# Patient Record
Sex: MALE
[De-identification: names, ages, dates, MRNs, and addresses within clinical notes are randomized per-mention and may not be internally consistent; named-entity substitution may affect disease eponyms.]

---

## 2018-02-13 VITALS — DIASTOLIC BLOOD PRESSURE: 69 MMHG | SYSTOLIC BLOOD PRESSURE: 116 MMHG

## 2018-02-13 LAB
APTT PPP: 30.6 SEC (ref 26.3–35.5)
BASOPHILS NFR BLD AUTO: 0.3 % (ref 0–5)
BUN SERPL-MCNC: 22 MG/DL (ref 7–18)
CHLORIDE SERPL-SCNC: 103 MMOL/L (ref 101–111)
CO2 SERPL-SCNC: 33 MMOL/L (ref 21–32)
CREAT SERPL-MCNC: 1.2 MG/DL (ref 0.5–1.5)
EOSINOPHIL NFR BLD AUTO: 1.8 % (ref 0–8)
ERYTHROCYTE [DISTWIDTH] IN BLOOD BY AUTOMATED COUNT: 15.6 % (ref 11–15.5)
GFR SERPL CREATININE-BSD FRML MDRD: 63 ML/MIN (ref 60–?)
GLUCOSE SERPL-MCNC: 121 MG/DL (ref 70–105)
HCT VFR BLD AUTO: 44.1 % (ref 42–54)
INR PPP: 1.08 (ref 0.85–1.15)
LYMPHOCYTES NFR SPEC AUTO: 25 % (ref 21–51)
MCH RBC QN AUTO: 27.5 PG (ref 27–33)
MCHC RBC AUTO-ENTMCNC: 34 G/DL (ref 32–36)
MCV RBC AUTO: 81.1 FL (ref 79–99)
MONOCYTES NFR BLD AUTO: 9.1 % (ref 3–13)
NEUTROPHILS NFR BLD AUTO: 63.8 % (ref 40–77)
NRBC BLD MANUAL-RTO: 0 % (ref 0–0.19)
PLATELET # BLD AUTO: 159 K/UL (ref 130–400)
POTASSIUM SERPL-SCNC: 4 MMOL/L (ref 3.5–5.1)
PROTHROMBIN TIME: 11.3 SEC (ref 9.6–11.6)
RBC # BLD AUTO: 5.44 MIL/UL (ref 4.5–6.2)
SODIUM SERPL-SCNC: 142 MMOL/L (ref 136–145)
WBC # BLD AUTO: 8.3 K/UL (ref 4.8–10.8)

## 2018-02-15 ENCOUNTER — HOSPITAL ENCOUNTER (OUTPATIENT)
Dept: HOSPITAL 90 - ENDO | Age: 73
Discharge: HOME | End: 2018-02-15
Attending: INTERNAL MEDICINE
Payer: COMMERCIAL

## 2018-02-15 VITALS — DIASTOLIC BLOOD PRESSURE: 64 MMHG | SYSTOLIC BLOOD PRESSURE: 99 MMHG

## 2018-02-15 VITALS — BODY MASS INDEX: 39.55 KG/M2 | WEIGHT: 292 LBS | HEIGHT: 72 IN

## 2018-02-15 VITALS — DIASTOLIC BLOOD PRESSURE: 62 MMHG | SYSTOLIC BLOOD PRESSURE: 102 MMHG

## 2018-02-15 VITALS — SYSTOLIC BLOOD PRESSURE: 116 MMHG | DIASTOLIC BLOOD PRESSURE: 75 MMHG

## 2018-02-15 DIAGNOSIS — I48.1: Primary | ICD-10-CM

## 2018-02-15 DIAGNOSIS — Z79.82: ICD-10-CM

## 2018-02-15 DIAGNOSIS — I10: ICD-10-CM

## 2018-02-15 DIAGNOSIS — Z79.899: ICD-10-CM

## 2018-02-15 DIAGNOSIS — Z79.01: ICD-10-CM

## 2018-02-15 DIAGNOSIS — I25.10: ICD-10-CM

## 2018-02-15 PROCEDURE — 85610 PROTHROMBIN TIME: CPT

## 2018-02-15 PROCEDURE — 36415 COLL VENOUS BLD VENIPUNCTURE: CPT

## 2018-02-15 PROCEDURE — 80048 BASIC METABOLIC PNL TOTAL CA: CPT

## 2018-02-15 PROCEDURE — 85730 THROMBOPLASTIN TIME PARTIAL: CPT

## 2018-02-15 PROCEDURE — 85025 COMPLETE CBC W/AUTO DIFF WBC: CPT

## 2018-02-15 PROCEDURE — 93005 ELECTROCARDIOGRAM TRACING: CPT

## 2018-02-15 PROCEDURE — 92960 CARDIOVERSION ELECTRIC EXT: CPT

## 2019-10-24 ENCOUNTER — HOSPITAL ENCOUNTER (OUTPATIENT)
Dept: HOSPITAL 90 - DAH | Age: 74
Discharge: HOME | End: 2019-10-24
Attending: INTERNAL MEDICINE
Payer: COMMERCIAL

## 2019-10-24 VITALS — SYSTOLIC BLOOD PRESSURE: 110 MMHG | DIASTOLIC BLOOD PRESSURE: 76 MMHG

## 2019-10-24 VITALS — DIASTOLIC BLOOD PRESSURE: 62 MMHG | SYSTOLIC BLOOD PRESSURE: 86 MMHG

## 2019-10-24 VITALS — HEIGHT: 72 IN | BODY MASS INDEX: 38.6 KG/M2 | WEIGHT: 285 LBS

## 2019-10-24 VITALS — DIASTOLIC BLOOD PRESSURE: 68 MMHG | SYSTOLIC BLOOD PRESSURE: 96 MMHG

## 2019-10-24 VITALS — DIASTOLIC BLOOD PRESSURE: 76 MMHG | SYSTOLIC BLOOD PRESSURE: 112 MMHG

## 2019-10-24 VITALS — SYSTOLIC BLOOD PRESSURE: 92 MMHG | DIASTOLIC BLOOD PRESSURE: 61 MMHG

## 2019-10-24 VITALS — DIASTOLIC BLOOD PRESSURE: 74 MMHG | SYSTOLIC BLOOD PRESSURE: 98 MMHG

## 2019-10-24 VITALS — SYSTOLIC BLOOD PRESSURE: 93 MMHG | DIASTOLIC BLOOD PRESSURE: 69 MMHG

## 2019-10-24 VITALS — DIASTOLIC BLOOD PRESSURE: 65 MMHG | SYSTOLIC BLOOD PRESSURE: 95 MMHG

## 2019-10-24 VITALS — DIASTOLIC BLOOD PRESSURE: 70 MMHG | SYSTOLIC BLOOD PRESSURE: 108 MMHG

## 2019-10-24 VITALS — SYSTOLIC BLOOD PRESSURE: 100 MMHG | DIASTOLIC BLOOD PRESSURE: 78 MMHG

## 2019-10-24 VITALS — DIASTOLIC BLOOD PRESSURE: 66 MMHG | SYSTOLIC BLOOD PRESSURE: 100 MMHG

## 2019-10-24 VITALS — DIASTOLIC BLOOD PRESSURE: 72 MMHG | SYSTOLIC BLOOD PRESSURE: 106 MMHG

## 2019-10-24 VITALS — DIASTOLIC BLOOD PRESSURE: 72 MMHG | SYSTOLIC BLOOD PRESSURE: 103 MMHG

## 2019-10-24 DIAGNOSIS — Z87.01: ICD-10-CM

## 2019-10-24 DIAGNOSIS — Z79.01: ICD-10-CM

## 2019-10-24 DIAGNOSIS — Z79.899: ICD-10-CM

## 2019-10-24 DIAGNOSIS — I48.0: Primary | ICD-10-CM

## 2019-10-24 DIAGNOSIS — Z79.82: ICD-10-CM

## 2019-10-24 DIAGNOSIS — E66.9: ICD-10-CM

## 2019-10-24 DIAGNOSIS — Z82.49: ICD-10-CM

## 2019-10-24 DIAGNOSIS — Z82.3: ICD-10-CM

## 2019-10-24 DIAGNOSIS — G47.33: ICD-10-CM

## 2019-10-24 DIAGNOSIS — E78.5: ICD-10-CM

## 2019-10-24 DIAGNOSIS — F41.9: ICD-10-CM

## 2019-10-24 DIAGNOSIS — Z95.0: ICD-10-CM

## 2019-10-24 DIAGNOSIS — I10: ICD-10-CM

## 2019-10-24 DIAGNOSIS — Z98.890: ICD-10-CM

## 2019-10-24 DIAGNOSIS — C61: ICD-10-CM

## 2019-10-24 LAB
APTT PPP: 32.5 SEC (ref 26.3–35.5)
BASOPHILS NFR BLD AUTO: 0.3 % (ref 0–5)
BUN SERPL-MCNC: 27 MG/DL (ref 7–18)
CHLORIDE SERPL-SCNC: 102 MMOL/L (ref 101–111)
CO2 SERPL-SCNC: 34 MMOL/L (ref 21–32)
CREAT SERPL-MCNC: 1.3 MG/DL (ref 0.5–1.5)
EOSINOPHIL NFR BLD AUTO: 3.1 % (ref 0–8)
ERYTHROCYTE [DISTWIDTH] IN BLOOD BY AUTOMATED COUNT: 16.7 % (ref 11–15.5)
GFR SERPL CREATININE-BSD FRML MDRD: 58 ML/MIN (ref 60–?)
GLUCOSE SERPL-MCNC: 115 MG/DL (ref 70–105)
HCT VFR BLD AUTO: 47.2 % (ref 42–54)
INR PPP: 1.11 (ref 0.85–1.15)
LYMPHOCYTES NFR SPEC AUTO: 19.6 % (ref 21–51)
MCH RBC QN AUTO: 27.4 PG (ref 27–33)
MCHC RBC AUTO-ENTMCNC: 33.3 G/DL (ref 32–36)
MCV RBC AUTO: 82.2 FL (ref 79–99)
MONOCYTES NFR BLD AUTO: 8.9 % (ref 3–13)
NEUTROPHILS NFR BLD AUTO: 68.1 % (ref 40–77)
NRBC BLD MANUAL-RTO: 0.1 % (ref 0–0.19)
PLATELET # BLD AUTO: 139 K/UL (ref 130–400)
POTASSIUM SERPL-SCNC: 4.2 MMOL/L (ref 3.5–5.1)
PROTHROMBIN TIME: 11.6 SEC (ref 9.6–11.6)
RBC # BLD AUTO: 5.74 MIL/UL (ref 4.5–6.2)
SODIUM SERPL-SCNC: 141 MMOL/L (ref 136–145)
WBC # BLD AUTO: 8.4 K/UL (ref 4.8–10.8)

## 2019-10-24 PROCEDURE — 85610 PROTHROMBIN TIME: CPT

## 2019-10-24 PROCEDURE — 92960 CARDIOVERSION ELECTRIC EXT: CPT

## 2019-10-24 PROCEDURE — 80048 BASIC METABOLIC PNL TOTAL CA: CPT

## 2019-10-24 PROCEDURE — 85025 COMPLETE CBC W/AUTO DIFF WBC: CPT

## 2019-10-24 PROCEDURE — 36415 COLL VENOUS BLD VENIPUNCTURE: CPT

## 2019-10-24 PROCEDURE — 85730 THROMBOPLASTIN TIME PARTIAL: CPT

## 2019-10-24 PROCEDURE — 99156 MOD SED OTH PHYS/QHP 5/>YRS: CPT

## 2019-10-24 PROCEDURE — 93005 ELECTROCARDIOGRAM TRACING: CPT

## 2019-10-24 NOTE — NUR
PATIENT ARRIVED



PATIENT ARRIVED TO DAY PATIENT ACCOMPANIED BY SPOUSE (TALYA). PATIENT AAOX3, RESPIRATIONS 
UNLABORED, VITAL SIGNS STABLE, DENIES ANY PAIN AT THIS TIME. PROCEDURE VERIFIED AND 
CONFIRMED WITH PATIENT. HOSPITAL ROUTINE EXPLAINED TO PATIENT AND SPOUSE AND BOTH VERBALIZED 
UNDERSTANDING. ALL QUESTIONS/CONCERNS ADDRESSED.

## 2019-10-24 NOTE — NUR
DISCHARGE INSTRUCTIONS



DISCHARGE INSTRUCTIONS PROVIDED TO PATIENT'S SPOUSE AND FOLLOW UP APPOINTMENT AS WELL. 
HANDOUTS PROVIDED AND INSTRUCTIONS GIVEN REGARDING POST SEDATION CARE AND POST CARDIOVERSION 
CARE. PATIENT'S SPOUSE VERBALIZED UNDERSTANDING. ALL QUESTIONS/CONCERNS ADDRESSED.

## 2019-10-24 NOTE — NUR
CARDIOVERSION



1230 DR MASTERSON IN ROOM, EXPLAINED PROCEDURE TO PATIENT AND PATIENT'S SPOUSE. 



1232 TIMEOUT DONE BY VICTOR HUGO EUCEDA



1233 FIRST IV PUSH PROPOFOL  ADMINISTERED BY DR HANSEN



1234 SHOCK ADMINISTERED 200 JOULES BY DR MASTERSON



1234 SECOND IV PUSH PROPOFOL ADMINISTERED BY DR HANSEN



1235 SECOND SHOCK ADMINISTERED 360 JOULES BY DR MASTERSON



1240 DR MASTERSON LEFT PATIENT'S ROOM

## 2019-10-24 NOTE — NUR
PATIENT DISCHARGED FROM FACILITY VIA WHEELCHAIR BY TALIA GRAY. PATIENT ASSISTED INTO PRIVATE 
VEHICLE DRIVEN BY SPOUSE

## 2025-01-19 ENCOUNTER — HOSPITAL ENCOUNTER (INPATIENT)
Dept: HOSPITAL 90 - EDH | Age: 80
LOS: 1 days | Discharge: LEFT BEFORE DISCHARGE | DRG: 291 | End: 2025-01-20
Attending: INTERNAL MEDICINE | Admitting: INTERNAL MEDICINE
Payer: MEDICARE

## 2025-01-19 VITALS — HEIGHT: 74 IN | BODY MASS INDEX: 35.17 KG/M2 | WEIGHT: 274.03 LBS

## 2025-01-19 VITALS — RESPIRATION RATE: 18 BRPM | OXYGEN SATURATION: 99 % | HEART RATE: 79 BPM

## 2025-01-19 VITALS — RESPIRATION RATE: 18 BRPM | OXYGEN SATURATION: 98 %

## 2025-01-19 DIAGNOSIS — Z95.3: ICD-10-CM

## 2025-01-19 DIAGNOSIS — I48.0: ICD-10-CM

## 2025-01-19 DIAGNOSIS — I87.8: ICD-10-CM

## 2025-01-19 DIAGNOSIS — I43: ICD-10-CM

## 2025-01-19 DIAGNOSIS — Z95.828: ICD-10-CM

## 2025-01-19 DIAGNOSIS — D50.9: ICD-10-CM

## 2025-01-19 DIAGNOSIS — I50.43: ICD-10-CM

## 2025-01-19 DIAGNOSIS — E78.00: ICD-10-CM

## 2025-01-19 DIAGNOSIS — M51.9: ICD-10-CM

## 2025-01-19 DIAGNOSIS — Z85.46: ICD-10-CM

## 2025-01-19 DIAGNOSIS — Z92.3: ICD-10-CM

## 2025-01-19 DIAGNOSIS — L03.116: ICD-10-CM

## 2025-01-19 DIAGNOSIS — Z96.653: ICD-10-CM

## 2025-01-19 DIAGNOSIS — I13.0: Primary | ICD-10-CM

## 2025-01-19 DIAGNOSIS — L03.115: ICD-10-CM

## 2025-01-19 DIAGNOSIS — N18.30: ICD-10-CM

## 2025-01-19 DIAGNOSIS — Z95.0: ICD-10-CM

## 2025-01-19 DIAGNOSIS — Z98.84: ICD-10-CM

## 2025-01-19 DIAGNOSIS — E85.4: ICD-10-CM

## 2025-01-19 DIAGNOSIS — E03.9: ICD-10-CM

## 2025-01-19 DIAGNOSIS — I20.0: ICD-10-CM

## 2025-01-19 LAB
APPEARANCE UR: CLEAR
BASOPHILS # BLD AUTO: 0.04 K/UL (ref 0–0.2)
BASOPHILS NFR BLD AUTO: 0.4 % (ref 0–5)
BILIRUB UR QL STRIP: NEGATIVE MG/DL
BNP SERPL-MCNC: 223 PG/ML (ref 0–100)
BUN SERPL-MCNC: 36 MG/DL (ref 7–18)
BUN SERPL-MCNC: 38 MG/DL (ref 7–18)
CHLORIDE SERPL-SCNC: 98 MMOL/L (ref 101–111)
CHLORIDE SERPL-SCNC: 99 MMOL/L (ref 101–111)
CK SERPL-CCNC: 51 U/L (ref 21–232)
CK SERPL-CCNC: 55 U/L (ref 21–232)
CK SERPL-CCNC: 55 U/L (ref 21–232)
CO2 SERPL-SCNC: 30 MMOL/L (ref 21–32)
CO2 SERPL-SCNC: 33 MMOL/L (ref 21–32)
COLOR UR: (no result)
CREAT SERPL-MCNC: 1.4 MG/DL (ref 0.5–1.3)
CREAT SERPL-MCNC: 1.5 MG/DL (ref 0.5–1.3)
EOSINOPHIL # BLD AUTO: 0.12 K/UL (ref 0–0.7)
EOSINOPHIL NFR BLD AUTO: 1.2 % (ref 0–8)
ERYTHROCYTE [DISTWIDTH] IN BLOOD BY AUTOMATED COUNT: 20.4 % (ref 11–15.5)
GFR SERPL CREATININE-BSD FRML MDRD: 47 ML/MIN (ref 90–?)
GFR SERPL CREATININE-BSD FRML MDRD: 51 ML/MIN (ref 90–?)
GLUCOSE SERPL-MCNC: 106 MG/DL (ref 70–105)
GLUCOSE SERPL-MCNC: 162 MG/DL (ref 70–105)
GLUCOSE UR STRIP-MCNC: NEGATIVE MG/DL
HBA1C MFR BLD: 7.3 % (ref 4–6)
HCT VFR BLD AUTO: 36.5 % (ref 42–54)
HGB UR QL STRIP: NEGATIVE
IMM GRANULOCYTES # BLD: 0.06 K/UL (ref 0–1)
IRON SATN MFR SERPL: 4.2 % (ref 30–44)
IRON SERPL-MCNC: 17 MCG/DL (ref 65–175)
KETONES UR STRIP-MCNC: NEGATIVE MG/DL
LEUKOCYTE ESTERASE UR QL STRIP: NEGATIVE LEU/UL
LYMPHOCYTES # SPEC AUTO: 1.5 K/UL (ref 1–4.8)
LYMPHOCYTES NFR SPEC AUTO: 14.4 % (ref 21–51)
MAGNESIUM SERPL-MCNC: 2.1 MG/DL (ref 1.8–2.4)
MCH RBC QN AUTO: 20 PG (ref 27–33)
MCHC RBC AUTO-ENTMCNC: 29.3 G/DL (ref 32–36)
MCV RBC AUTO: 68.2 FL (ref 79–99)
MICRO URNS: NO
MONOCYTES # BLD AUTO: 1.1 K/UL (ref 0.1–1)
MONOCYTES NFR BLD AUTO: 10.4 % (ref 3–13)
NEUTROPHILS # BLD AUTO: 7.5 K/UL (ref 1.8–7.7)
NEUTROPHILS NFR BLD AUTO: 73 % (ref 40–77)
NITRITE UR QL STRIP: NEGATIVE
NRBC BLD MANUAL-RTO: 0 % (ref 0–0.19)
PH UR STRIP: 6.5 [PH] (ref 5–8)
PLATELET # BLD AUTO: 219 K/UL (ref 130–400)
POTASSIUM SERPL-SCNC: 3.8 MMOL/L (ref 3.5–5.1)
POTASSIUM SERPL-SCNC: 4.5 MMOL/L (ref 3.5–5.1)
PROT UR QL STRIP: NEGATIVE MG/DL
RBC # BLD AUTO: 5.35 MIL/UL (ref 4.5–6.2)
RBC MORPH BLD: (no result)
SARS-COV-2 RNA SPEC QL NAA+PROBE: (no result)
SODIUM SERPL-SCNC: 136 MMOL/L (ref 136–145)
SODIUM SERPL-SCNC: 137 MMOL/L (ref 136–145)
SP GR UR STRIP: 1.01 (ref 1–1.03)
TIBC SERPL-MCNC: 404 MCG/DL (ref 250–450)
TSH SERPL DL<=0.05 MIU/L-ACNC: 4.29 UIU/ML (ref 0.36–3.74)
UROBILINOGEN UR STRIP-MCNC: 0.2 MG/DL (ref 0.2–1)
WBC # BLD AUTO: 10.2 K/UL (ref 4.8–10.8)

## 2025-01-19 PROCEDURE — 83880 ASSAY OF NATRIURETIC PEPTIDE: CPT

## 2025-01-19 PROCEDURE — 82728 ASSAY OF FERRITIN: CPT

## 2025-01-19 PROCEDURE — 84145 PROCALCITONIN (PCT): CPT

## 2025-01-19 PROCEDURE — 93306 TTE W/DOPPLER COMPLETE: CPT

## 2025-01-19 PROCEDURE — 80048 BASIC METABOLIC PNL TOTAL CA: CPT

## 2025-01-19 PROCEDURE — 93970 EXTREMITY STUDY: CPT

## 2025-01-19 PROCEDURE — 86140 C-REACTIVE PROTEIN: CPT

## 2025-01-19 PROCEDURE — 71045 X-RAY EXAM CHEST 1 VIEW: CPT

## 2025-01-19 PROCEDURE — 84443 ASSAY THYROID STIM HORMONE: CPT

## 2025-01-19 PROCEDURE — 93005 ELECTROCARDIOGRAM TRACING: CPT

## 2025-01-19 PROCEDURE — 81003 URINALYSIS AUTO W/O SCOPE: CPT

## 2025-01-19 PROCEDURE — 94640 AIRWAY INHALATION TREATMENT: CPT

## 2025-01-19 PROCEDURE — 84484 ASSAY OF TROPONIN QUANT: CPT

## 2025-01-19 PROCEDURE — 85025 COMPLETE CBC W/AUTO DIFF WBC: CPT

## 2025-01-19 PROCEDURE — 83036 HEMOGLOBIN GLYCOSYLATED A1C: CPT

## 2025-01-19 PROCEDURE — 94664 DEMO&/EVAL PT USE INHALER: CPT

## 2025-01-19 PROCEDURE — 87635 SARS-COV-2 COVID-19 AMP PRB: CPT

## 2025-01-19 PROCEDURE — 36415 COLL VENOUS BLD VENIPUNCTURE: CPT

## 2025-01-19 PROCEDURE — 83540 ASSAY OF IRON: CPT

## 2025-01-19 PROCEDURE — 82550 ASSAY OF CK (CPK): CPT

## 2025-01-19 PROCEDURE — 83550 IRON BINDING TEST: CPT

## 2025-01-19 PROCEDURE — 87804 INFLUENZA ASSAY W/OPTIC: CPT

## 2025-01-19 PROCEDURE — 80053 COMPREHEN METABOLIC PANEL: CPT

## 2025-01-19 PROCEDURE — 83735 ASSAY OF MAGNESIUM: CPT

## 2025-01-19 RX ADMIN — POTASSIUM CHLORIDE SCH MEQ: 1500 TABLET, EXTENDED RELEASE ORAL at 21:04

## 2025-01-19 NOTE — ERN
ED Note


History of Present Illness


Stated Complaint:  CP, NAUSEA


Chief Complaint:  Chest Pain


Time Seen by MD:  09:35


Dictation:


79-year-old male with a history of CABG and pacemaker presents to the ED for 

evaluation of chest pain onset 4 days ago.  Patient reports shortness of breath,

nausea, but denies any vomiting or other associated symptoms at this time.  As 

per family, patient was seen at [facility] 4 days ago where he had EKG and lab work 

performed which were normal and was discharged home, but mentioned patient has 

been complaining of chest pain. Cardiologist- hospitals


Allergies:  


Coded Allergies:  


     No Known Drug Allergies (Verified  Allergy, Unknown, 8/21/18)


     triamcinolone (Unverified  Allergy, Unknown, 8/21/18)


Home Meds


Reported Medications


Aspirin (ASPIRIN 81 MG ECTAB) 81 Mg Ectab, 81 MG PO DAILY, TAB.EC


   3/22/23


Potassium Chloride (Potassium Chloride) 10 Meq Tab.er.prt, 10 MEQ PO BID


   3/22/23


Levothyroxine Sodium (Levothyroxine) 25 Mcg Capsule, 25 MCG PO ACBKFST, CAP


   3/22/23


Metolazone (Metolazone) 2.5 Mg Tablet, 2.5 MG PO QWEEK, TAB


   3/22/23


Spironolactone (Spironolactone) 25 Mg Tablet, 25 MG PO DAILY, TAB


   3/22/23


Metolazone (Metolazone) 5 Mg Tablet, 5 MG PO DAILY PRN for WEIGHT GAIN, TAB


   3/22/23


Torsemide (Torsemide) 20 Mg Tablet, 40 MG PO DAILY, TAB


   3/22/23


Tafamidis (Vyndamax) 61 Mg Capsule, 61 MG PO DAILY, CAP


   3/22/23


Calcium Carbonate/Vitamin D3 (Caltrate 600 + D Soft Chew Tab) 1 Each Tab.chew, 2

EACH PO DAILY, TAB.CHEW


   1/17/17


Apixaban (Eliquis) 5 Mg Tablet, 5 MG PO BID, TAB


   1/17/17


Rosuvastatin Calcium (Rosuvastatin Calcium) 5 Mg Tablet, 5 MG PO DAILYDINNER, 

TAB


   9/26/16


Multivits-Min/FA/Lycopene/Lut (Centrum Silver Tablet) 1 Each Tablet, 1 EACH PO 

DAILY, TAB


   9/26/16


Olmesartan Medoxomil (Benicar) 20 Mg Tablet, 20 MG PO DAILY, TAB


   8/18/14





Past Medical History


Past Medical History:  A-Fib, High Cholesterol, Heart Disease, Hypertension, 

Prostatitis


Additional Past Medical Hx:  THYROID, HX OF PROSTATE CA, AORTIC STENOSIS, GOUT


Surgical History:  Pacer/AICD


Surgical History Other:  KNEE, SHOULDER, BACK





Review of System


Dictation


Constitutional: Negative for fever,chills, and weight loss


Eyes: Negative for injury, pain,redness, and discharge


ENT: Negative for injury,pain or swelling


Cardiovascular:  Positive for chest pain negative I palpitations, and edema


Respiratory: Negative for shortness of breath, cough, and wheezing, 


Abdomen/GI: Negative for abdominal pain, nausea, vomiting, diarrhea, and 

constipation


Back: Negative for injury and pain


: Negative for injury, bleeding and discharge


MS/Extremity: Negative for injury and deformity


Skin: Negative for rash, and discoloration


Neuro: Negative for headache, weakness, numbness, tingling, and seizure 


Psych: Negative for suicide ideation, homicidal ideation, and hallucinations





Initial Vital Sign


VS





                                   Vital Signs








  Date Time  Temp Pulse Resp B/P (MAP) Pulse Ox O2 Delivery O2 Flow Rate FiO2


 


1/19/25 09:33 98.1 53 16 112/59 99 Room Air 0 


 


1/19/25 09:39        21











Physical Exam


Dictation


General: awake, alert, patient appears uncomfortable


Head/Face: Normocephalic, atraumatic


Eyes: PERRL, EOMI, vision at baseline


ENT: oral cavity clear, TMs clear, no signs of infection


Neck: Trachea midline, supple, no nuchal rigidity


Cardiovascular: RRR, normal S1/S2, No MRGs, no JVD


Respiratory:  Diminished breath sounds at bases, bilateral crackles


Abdomen: Soft, non-tender, non-distended, normal bowel sounds, no guarding or 

rebound.


Skin: Warm, dry, normal turgor, no rash


MS/Extremity: Pulses equal, no cyanosis, neurovascular intact, FROM


Neuro: COAx4, GCS 15, strength 5/5, CN 2-12 intact, normal cerebellar exam, 

normal gait,


Psych: Normal behavior, mood, and affect normal





Results (Laboratory/Radiology)


Laboratory/Radiology





Laboratory Tests








Test


 1/19/25


09:57 1/19/25


10:18 1/19/25


11:35


 


White Blood Count


 10.2 K/uL


(4.8-10.8) 


 





 


Red Blood Count


 5.35 MIL/uL


(4.50-6.20) 


 





 


Hemoglobin


 10.7 g/dL


(14.0-18.0)  L 


 





 


Hematocrit


 36.5 % (42-54)


L 


 





 


Mean Corpuscular Volume


 68.2 fL


(79-99)  L 


 





 


Mean Corpuscular Hemoglobin


 20.0 pg


(27.0-33.0)  L 


 





 


Mean Corpuscular Hemoglobin


Concent 29.3 g/dL


(32.0-36.0)  L 


 





 


Red Cell Distribution Width


 20.4 %


(11.0-15.5)  H 


 





 


Platelet Count


 219 K/uL


(130-400) 


 





 


Mean Platelet Volume


 fL (7.5-10.5)


 


 





 


Immature Granulocyte % (Auto) 0.6 % (0-1)    


 


Neutrophils (%) (Auto)


 73.0 %


(40.0-77.0) 


 





 


Lymphocytes (%) (Auto)


 14.4 %


(21.0-51.0)  L 


 





 


Monocytes (%) (Auto)


 10.4 %


(3.0-13.0) 


 





 


Eosinophils (%) (Auto)


 1.2 %


(0.0-8.0) 


 





 


Basophils (%) (Auto)


 0.4 %


(0.0-5.0) 


 





 


Neutrophils # (Auto)


 7.5 K/uL


(1.8-7.7) 


 





 


Lymphocytes # (Auto)


 1.5 K/uL


(1.0-4.8) 


 





 


Monocytes # (Auto)


 1.1 K/uL


(0.1-1.0)  H 


 





 


Eosinophils # (Auto)


 0.12 K/uL


(0.00-0.70) 


 





 


Basophils # (Auto)


 0.04 K/uL


(0.00-0.20) 


 





 


Absolute Immature Granulocyte


(auto 0.06 K/uL


(0-1) 


 





 


Nucleated Red Blood Cells


 0.0 %


(0.0-0.19) 


 





 


Red Blood Cell Morphology See comments    


 


Sodium Level


 136 mmol/L


(136-145) 


 





 


Potassium Level


 4.5 mmol/L


(3.5-5.1) 


 





 


Chloride Level


 99 mmol/L


(101-111)  L 


 





 


Carbon Dioxide Level


 30 mmol/L


(21-32) 


 





 


Blood Urea Nitrogen


 38 mg/dL


(7-18)  H 


 





 


Creatinine


 1.5 mg/dL


(0.5-1.3)  H 


 





 


Glomerular Filtration Rate


Calc 47 mL/min


(>90) 


 





 


Random Glucose


 162 mg/dL


()  H 


 





 


Total Calcium


 9.7 mg/dL


(8.5-10.1) 


 





 


Total Creatine Kinase


 55 U/L


() 


 





 


B-Type Natriuretic Peptide


 223 pg/mL


(0-100)  H 


 





 


Troponin I


 


 < 0.05 ng/mL


(0.00-0.05) 





 


Urine Color


 


 


 LIGHT-YELLOW


(YELLOW)


 


Urine Appearance   CLEAR (CLEAR)  


 


Urine pH   6.5 (5.0-8.0)  


 


Urine Specific Gravity


 


 


 1.007


(1.001-1.031)


 


Urine Protein


 


 


 NEGATIVE mg/dL


(NEGATIVE)


 


Urine Glucose (UA)


 


 


 NEGATIVE mg/dL


(NEGATIVE)


 


Urine Ketones


 


 


 NEGATIVE mg/dL


(NEGATIVE)


 


Urine Occult Blood


 


 


 NEGATIVE


(NEGATIVE)


 


Urine Nitrate


 


 


 NEGATIVE


(NEGATIVE)


 


Urine Bilirubin


 


 


 NEGATIVE mg/dL


(NEGATIVE)


 


Urine Urobilinogen


 


 


 0.2 mg/dL


(0.2-1.0)


 


Urine Leukocyte Esterase


 


 


 NEGATIVE


Sarah/uL








Labs Reviewed?:  Yes


EKG Comment:


EKG 01/19/2025 time 9:39 a.m. ventricular rate 82, OR 58, QRS D 166, .  

Ventricular paced complexes.  Anterior T-waves.  No STEMI


Ultrasound Comment:


REASON: CHEST PAIN


ORDERING PHYSICIAN: JESSICA TIDWELL MD


PROCEDURE: CXR1VW  -  CHEST 1VW





CHEST 1VW 





CLINICAL HISTORY:   CHEST PAIN 





COMPARISON: 3/22/2023 





TECHNIQUE:  Single view of the chest was obtained. 





FINDINGS: 


Lungs are clear.  Cardiac size is enlarged but stable. The bony


structures and pacemaker are unchanged. 


 


IMPRESSION:  Cardiomegaly.








DICTATED BY: HANY ESPINOZA DO


DATE: 01/19/25 1051





ED Course


ED Course





Orders








Procedure Category Date Status





  Time 


 


Vital Signs Per CPOE 1/19/25 Transmitted





Routine  09:38 


 


B-Type Natriuretic LAB 1/19/25 Complete





Peptide  09:38 


 


Chest 1vw RAD 1/19/25 Resulted





  09:38 


 


12 Lead Ekg Tracing- EKG 1/19/25 Logged





Technical  09:38 


 


Oxygen By Nc/Pulse Ox CPOE 1/19/25 Transmitted





  09:38 


 


Maintain Iv CPOE 1/19/25 Transmitted





  09:38 


 


Iv Insertion CPOE 1/19/25 Transmitted





  09:38 


 


Cardiac Monitoring CPOE 1/19/25 Transmitted





  09:38 


 


Pulse Oximetry With CPOE 1/19/25 Transmitted





Vs And Prn  09:38 


 


Cbc With Differential LAB 1/19/25 Complete





  09:38 


 


Activity: Br W/Brp CPOE 1/19/25 Transmitted





With Assist  09:38 


 


Creatine Kinase, Total LAB 1/19/25 Complete





  09:38 


 


Urinalysis Profile LAB 1/19/25 Complete





  09:38 


 


Troponin Poc Order LAB 1/19/25 Complete





Only  09:38 


 


Bedside Troponin-I LAB.ER 1/19/25 In Process





 (Poc)  09:38 


 


Basic Metabolic Panel LAB 1/19/25 Complete





  09:38 


 


Furosemide 40mg Vial PHA 1/19/25 Complete





 (Lasix 40mg Vial)  10:30 


 


Morphine 2mg Syg PHA 1/19/25 Complete





 (Morphine 2mg Syg)  10:30 








Vital Signs








  Date Time  Temp Pulse Resp B/P (MAP) Pulse Ox O2 Delivery O2 Flow Rate FiO2


 


1/19/25 12:39 97.9 53 20 118/77 98 Room Air* 0 21


 


1/19/25 10:16 98.1 89 23 126/73 100 Nasal Cannula* 2 28


 


1/19/25 09:39 98.1 53 16 112/59 99 Room Air* 0 21


 


1/19/25 09:33 98.1 53 16 112/59 99 Room Air 0 

















HEART Score Response (Comments) Value


 


History: Moderate suspicion (+1) 1


 


EKG: Repolarization changes 1


 


Age: > 65yrs (+2) 2


 


Risk Factors: No known risk factors (0) 0


 


Initial Troponin: Normal limit (0) 0


 


HEART Score Risk: Mod Risk for MACE (4-6) 


 


Total  4











Medical Decision Making


MDM


MDM : 


Differential diagnosis:  Chest pain, unstable angina, rule out ACS


1221- Patient is leaving AMA


1238- Patient changed his mind and will stay for admission


1258- Dr. Pineda consult, accepts patient for admission


Rationale: Tests considered and ordered secondary to shared decision making 

include: labs, ECG and radiology


Risk of complication and/or morbidity or mortality of patient management: None


Medications-Per medication reconciliation


Need for hospitalization: Patient does meet criteria for hospitalization.


Need for emergency major/minor surgery: No





There are no social concerns with this patient.





I independently interpreted the test that were performed, results were reviewed 

by me and considered findings on radiology if ordered.





Medical management and examination interpretation discussions were had by me 

with other qualified healthcare professionals as indicated for the patient's 

care.





DX & DISP


Disposition:  Inpatient


Decision to Admit Date:  Jan 19, 2025


Decision to Admit Time:  13:02


Departure


Impression:  


   Primary Impression:  Chest pain


   Additional Impression:  Rule out ACS


Condition:  Against Medical Advice


Referrals:  


NONE (PCP)











JESSICA TIDWELL MD      Jan 19, 2025 11:01

## 2025-01-19 NOTE — EKG
[facility]

                                       

Test Date:    2025               Test Time:    09:39:18

Pat Name:     DEMARCO ROGERS        Department:   [facility]IP

Patient ID:   [id number]           Room:         ED 17

Gender:       M                        Technician:   [id number]

:          1945               Requested By: JESSICA TIDWELL

Order Number: [id number]        Reading MD:   Nighat Lewis

                                 Measurements

Intervals                              Axis          

Rate:         82                       P:            0

CT:           58                       QRS:          -45

QRSD:         166                      T:            -20

QT:           403                                    

QTc:          510                                    

                           Interpretive Statements

Ventricular-paced complexes

Compared to ECG 2023 07:42:09

No significant changes

Electronically Signed On 2025 09:15:39 CST by Nighat Lewis



Please click the below link to view image of tracing.

## 2025-01-19 NOTE — HMCIMG
US VENOUS DOPPLER BILATERAL



REASON: lower extremity erythema, r/o any DVT



COMPARISON: None 



Technique: Bilateral   venous doppler ultrasound was performed with

spectral analysis and color flow imaging technique.



FINDINGS:

 There is a normal appearance of the common femoral, deep femoral, the

profunda femoris and popliteal veins. Proximal calf veins appear normal

as well. There is normal response to compression and augmentation. There

is no evidence of deep venous thrombosis.



IMPRESSION:

 Normal bilateral lower extremity venous Doppler ultrasound.

## 2025-01-19 NOTE — CONS
[facility] CONSULTATION NOTE 





Date Patient Seen: Jan 19, 2025 


Time of Visit: 20:55


Supervising Physician:  Dr. Nasir Bell 


Reason for Consultation:  Dyspnea evaluation








Consulting Physician: Dr Pineda


Outpatient Specialists: [ ]


Inpatient Consults:  Cardiology, [facility] pulmonology





PROBLEM LIST:





Acute dyspnea, might be related to longstanding CHF, we will await 2D echo 

report, if continued to be dyspneic we will recommend CTA of the chest, POA


Acute chest pain, POA


CHF, POA


Atrial fibrillation, rate controlled at this time, status post pacemaker 

placement


History of cardiac amyloidosis


History of severe aortic stenosis as she is/B aortic valve replacement





PLAN:





Admit per primary 


DuoNeb q.6 for shortness of breaths 


Continue CPAP 


Continue incentive spirometry 


Deep breathing exercises 


PD/OT eval and treat


We will await 2D echo report


We will also recommend venous Doppler study of the lower extremities to rule out

DVT


If continued to be dyspneic or requiring more oxygen we will recommend CTA of 

the chest








HPI:





79-year-old male past medical history of of cardiac amyloidosis, history of 

severe aortic stenosis status post bioprosthetic aortic valve replacement with 

concomitant Maze Machado procedure and left atrial appendage ligation in 11/2008, 

atrial fibrillation as s/p pacemaker placement, diastolic heart failure who 

presented to the ER with complaint of right-sided chest pain with associated 

periodic dyspnea.  Patient has been having issues with right-sided chest pain 

for several days.  He initially presented to OSH in [place] .  According to him

cardiac work up has been negative and was subsequently sent home. Patient 

continued to have intermittent episodes of right retrosternal pain which she 

described as moderate in intensity. 





On presentation to the hospital, patient was noted to be afebrile with T-max of 

97.9 F, heart rate in the 50s-70's, one blood pressure of 118/77.  Chest x-ray 

showed mild pulmonary vascular congestion.  Labs on presentation [place] 

[place] 689[address], hemoglobin 10.7, platelet count of 557435.  CMP remarkable for 

sodium of 136, potassium 4.5, chloride of 99, creatinine 1.5, BNP of 223.  He 

was also found to have scattered wheezing. [facility] pulmonology was then consulted 

for dyspnea evaluation.





Patient was seen and examined in ED with wife present at bedside.  At present 

patient is currently hemodynamically stable, on room air with appropriate oxygen

saturation, with no overt signs of increased work of breathing, or retraction.  

On auscultation patient has clear bilateral lung sounds.  Patient denies any 

history of flu-like symptoms recently, denies use of oxygen at home, denies any 

lung issue that was diagnosed in the past, he denies any smoking history, he has

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  Patient denies any 

secondhand smoking, or exposure to fumes, also denies any COVID or recent 

pneumonia.  Patient was diagnosed with YASIR and he uses his CPAP intermittently.














PAST MEDICAL HX:


see above





PAST SURGICAL HX:


noncontributory





SOCIAL HISTORY:


No tobacco, ETOH, or illicit drug use


Coded Allergies:  


     No Known Drug Allergies (Verified  Allergy, Unknown, 8/21/18)


     triamcinolone (Unverified  Allergy, Unknown, 8/21/18)





REVIEW OF SYSTEMS: 


12 point ROS reviewed with patient. Pertinent positives mentioned above. 

Otherwise negative.





PHYSICAL EXAM: 


GENERAL: alert, weak, awake oriented x 3


HEENT: EOMI, Sclera non icteric, moist mucosa 


NECK: Supple, no JVD, trachea midline 


LUNGS: Clear breath sounds bilaterally. No wheezes


HEART: Regular rate and rhythm. Normal S1 and S2, without murmurs 


ABD: Abdomen soft, nontender. Bowel sounds present


EXT: No clubbing cyanosis 1+ pitting edema, diffuse discoloration of bilateral 

lower extremity


NEURO: Alert and oriented to person, follows commands





                             Vital Signs (last 8hr)








  Date Time  Temp Pulse Resp B/P (MAP) Pulse Ox O2 Delivery O2 Flow Rate FiO2


 


1/19/25 19:56 97.3 84 17 118/71 100 Room Air* 0 21


 


1/19/25 19:46  79 18   N/A Room Air 0.0 21


 


1/19/25 19:46  79 18     


 


1/19/25 18:43 97.9 72 18 113/72 99 Room Air* 0 21


 


1/19/25 16:39   18   N/A Room Air  21


 


1/19/25 15:06 97.7 59 20 112/55 100 Room Air* 0 21











LABS:





                                Hematology Labs:








Test


 1/19/25


09:57 Range/Units


 


 


White Blood Count 10.2  4.8-10.8  K/uL


 


Red Blood Count


 5.35 


 4.50-6.20


MIL/uL


 


Hemoglobin 10.7 L 14.0-18.0  g/dL


 


Hematocrit 36.5 L 42-54  %


 


Mean Corpuscular Volume 68.2 L 79-99  fL


 


Mean Corpuscular Hemoglobin 20.0 L 27.0-33.0  pg


 


Mean Corpuscular Hemoglobin


Concent 29.3 L


 32.0-36.0  g/dL





 


Red Cell Distribution Width 20.4 H 11.0-15.5  %


 


Platelet Count 219  130-400  K/uL


 


Mean Platelet Volume   7.5-10.5  fL


 


Immature Granulocyte % (Auto) 0.6  0-1  %


 


Neutrophils (%) (Auto) 73.0  40.0-77.0  %


 


Lymphocytes (%) (Auto) 14.4 L 21.0-51.0  %


 


Monocytes (%) (Auto) 10.4  3.0-13.0  %


 


Eosinophils (%) (Auto) 1.2  0.0-8.0  %


 


Basophils (%) (Auto) 0.4  0.0-5.0  %


 


Neutrophils # (Auto) 7.5  1.8-7.7  K/uL


 


Lymphocytes # (Auto) 1.5  1.0-4.8  K/uL


 


Monocytes # (Auto) 1.1 H 0.1-1.0  K/uL


 


Eosinophils # (Auto) 0.12  0.00-0.70  K/uL


 


Basophils # (Auto) 0.04  0.00-0.20  K/uL


 


Absolute Immature Granulocyte


(auto 0.06 


 0-1  K/uL





 


Nucleated Red Blood Cells 0.0  0.0-0.19  %


 


Red Blood Cell Morphology See comments   








                                 Chemistry Labs:








Test


 1/19/25


16:09 1/19/25


10:18 1/19/25


09:57 Range/Units


 


 


Sodium Level 137    136-145  mmol/L


 


Potassium Level 3.8    3.5-5.1  mmol/L


 


Chloride Level 98 L   101-111  mmol/L


 


Carbon Dioxide Level 33 H   21-32  mmol/L


 


Blood Urea Nitrogen 36 H   7-18  mg/dL


 


Creatinine 1.4 H   0.5-1.3  mg/dL


 


Glomerular Filtration Rate


Calc 51 


 


 


 >90  mL/min





 


Random Glucose 106 H     mg/dL


 


Hemoglobin A1c 7.3 H   4.0-6.0  %


 


Estimated Average Glucose


(eAG) 163 H


 


 


   mg/dL





 


Total Calcium 9.1    8.5-10.1  mg/dL


 


Magnesium Level


 2.10 


 


 


 1.80-2.40


mg/dL


 


Iron Level 17 #L     mcg/dL


 


Total Iron Binding Capacity 404    250-450  mcg/dL


 


Percent Iron Saturation 4.2 L   30-44  %


 


Ferritin 16 L     ng/mL


 


Total Creatine Kinase 51      U/L


 


Troponin I High Sensitivity 32.2    4-75  ng/L


 


C-Reactive Protein,


Quantitative 4.30 H


 


 


 0.5-3.0  mg/L





 


Procalcitonin < 0.05 L   0.05-0.5  ng/mL


 


Thyroid Stimulating Hormone


(TSH) 4.29 H


 


 


 0.36-3.74


uIU/mL


 


Troponin I


 


 < 0.05 


 


 0.00-0.05


ng/mL


 


B-Type Natriuretic Peptide   223 H 0-100  pg/mL











DIAGNOSTICS / RADIOLOGY RESULTS:





US VENOUS DOPPLER BILATERAL





REASON: lower extremity erythema, r/o any DVT





COMPARISON: None 





Technique: Bilateral   venous doppler ultrasound was performed with


spectral analysis and color flow imaging technique.





FINDINGS:


 There is a normal appearance of the common femoral, deep femoral, the


profunda femoris and popliteal veins. Proximal calf veins appear normal


as well. There is normal response to compression and augmentation. There


is no evidence of deep venous thrombosis.





IMPRESSION:


 Normal bilateral lower extremity venous Doppler ultrasound.





CHEST 1VW 





CLINICAL HISTORY:   CHEST PAIN 





COMPARISON: 3/22/2023 





TECHNIQUE:  Single view of the chest was obtained. 





FINDINGS: 


Lungs are clear.  Cardiac size is enlarged but stable. The bony


structures and pacemaker are unchanged. 


 


IMPRESSION:  Cardiomegaly.





PLAN 





NEURO: 


Minimize central acting medications as possible. 


Maintain fall precautions, adequate lighting during the day





PULMONARY: 


Supplemental 02 as needed. 


Maintain aspiration precautions at all times





CARDIOVASCULAR: 


Follow hemodynamics. 


Vital signs per facility protocol





GI & NUTRITION:


Continue with nutritional support.


Continue stool softeners and laxatives as needed.





KIDNEYS & ELECTROLYTES: 


Strict monitoring of intake, output and overall fluid balance. 


Avoid nephrotoxic medications to the extent possible. 


Medications to be dosed according to renal function.


Monitor electrolytes and replace as needed





ENDOCRINE:


Maintain blood glucose between 100-180 at all times.


Hypoglycemia protocol in place





INFECTIOUS DISEASE: 


Trend temperature, WBC and procalcitonin level


Follow cultures, deescalate antibiotics as soon as possible.


Panculture if new onset fever





ONCOLOGY/HEMATOLOGY/COAGULATION: 


Monitor for s/s of bleeding


Monitor hemoglobin, coagulation studies as needed





SKIN:


Pressure ulcer prevention per facility protocol


Specialty mattress





ORTHO/REHAB:


Continue PT/OT 





Prophylaxis:


Continue GI and DVT prophylaxis





Code Status: 


Full Resuscitation





Disposition:


TBD





Other:


Total patient care time exceeds 35 minutes excluding all procedures.


Supervising physician:








ERICKA Paulino        Jan 19, 2025 21:07

## 2025-01-19 NOTE — CONS
[facility] CARDIOLOGY CONSULTATION NOTE





Date Patient Seen: Jan 19, 2025 


Time of Visit: 16:01


Requesting Physician: [ ]


Reason for Consultation: [ ]





History of Present Illness:


[For about four days the patient has been experiencing vague pain in his chest. 

 This began as a retrosternal/right parasternal pain which was intense for about

 half a day, then he had a similar residual discomfort that has persisted for 

three more days.  Pain is not exacerbated by deep inspiration, change of 

position, cough, or any other activity or lack of activity.  Patient has been 

sedentary because he does not have much energy and experiences overwhelming 

fatigue.  He also has chronic pedal edema and is known to have chronic heart 

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  Patient also has a 

history of paroxysmal atrial fibrillation. ]





Past Medical History:


[Amyloidosis 


Chronic heart failure 


Atrial fibrillation 


Chronic edema ]





Past Surgical History:


[None ]





Family History: 


[Noncontributory]





Social History: 


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





Habits: 


Denies] smoker.


[Denies] alcohol consumption.


[Denies] illicit drug use





Home Meds:


[ ] 








Current Meds:


[ ]





Review of Systems:


CONST: [No fever, fatigue, or weight changes.] 


EYES: [No recent vision problems.] 


ENT: [No congestion, ear pain, or sore throat.]


C/V: [Admits chest pain, denies palpitations, no change in chronic edema.]


RESP: [No cough, congestion, wife reports wheezing, chronically ill patient 

minimizes complaints of shortness of breath.] 


GI: [No abdominal pain, nausea, vomiting, constipation, or diarrhea.] 


: [No incontinence or dysuria.] 


SKIN: [Chronic stasis edema both lower extremities.] 


NEURO: [No headache, focal numbness or weakness, dizziness, or seizures.  Denies

 stroke or TIA]


PSYCH: [No depression or anxiety.]


HEME: [No abnormal bruising or bleeding.] 


LYMPH: [No swollen glands.]





Physical Examination:


GENERAL: [Appears mildly breathless and after minimal activity exhibits 

paradoxical respiration but is oriented and alert and cooperative.]


HEAD: [Normal with no signs of head trauma.]


EYES: [PERRLA, EOMI, conjunctiva and sclera normal.]


ENT: [Hearing grossly intact.]


NECK: [JVD to the angle of the mandible in a 30 degree upright position Normal 

carotid upstrokes without bruits.]


LUNGS: [Scattered expiratory wheezing and rhonchi, no rales, diffusely 

diminished breath sounds.]


HEART: [Normal rate and rhythm. Normal S1 and S2 without mumurs, gallop or rub.]


VASC: [2+ bipedal brawny edema.]


ABD: [Bowel sounds normal, soft, nontender, no masses, no organomegaly. No 

audible bruits.  Abdomen protuberant but can not discern presence or absence of 

ascites]


: [Not examined]


LYMPH: [Bipedal lymphadenopathy with thickened, rough, red and warm skin.]


EXT: [No clubbing, cyanosis but 2+ brawny edema.]


SKIN: [Chronic stasis changes with cellulitis both lower extremities from the 

calf down, no open ulcers.]


NEURO: [Awake, alert, and oriented x3. .]





                             Vital Signs (last 8hr)








  Date Time  Temp Pulse Resp B/P (MAP) Pulse Ox O2 Delivery O2 Flow Rate FiO2


 


1/19/25 15:06 97.7 59 20 112/55 100 Room Air* 0 21


 


1/19/25 12:39 97.9 53 20 118/77 98 Room Air* 0 21


 


1/19/25 10:16 98.1 89 23 126/73 100 Nasal Cannula* 2 28


 


1/19/25 09:39 98.1 53 16 112/59 99 Room Air* 0 21


 


1/19/25 09:33 98.1 53 16 112/59 99 Room Air 0 








Laboratory:


[ ] 








                                Hematology Labs:








Test


 1/19/25


09:57 Range/Units


 


 


White Blood Count 10.2  4.8-10.8  K/uL


 


Red Blood Count


 5.35 


 4.50-6.20


MIL/uL


 


Hemoglobin 10.7 L 14.0-18.0  g/dL


 


Hematocrit 36.5 L 42-54  %


 


Mean Corpuscular Volume 68.2 L 79-99  fL


 


Mean Corpuscular Hemoglobin 20.0 L 27.0-33.0  pg


 


Mean Corpuscular Hemoglobin


Concent 29.3 L


 32.0-36.0  g/dL





 


Red Cell Distribution Width 20.4 H 11.0-15.5  %


 


Platelet Count 219  130-400  K/uL


 


Mean Platelet Volume   7.5-10.5  fL


 


Immature Granulocyte % (Auto) 0.6  0-1  %


 


Neutrophils (%) (Auto) 73.0  40.0-77.0  %


 


Lymphocytes (%) (Auto) 14.4 L 21.0-51.0  %


 


Monocytes (%) (Auto) 10.4  3.0-13.0  %


 


Eosinophils (%) (Auto) 1.2  0.0-8.0  %


 


Basophils (%) (Auto) 0.4  0.0-5.0  %


 


Neutrophils # (Auto) 7.5  1.8-7.7  K/uL


 


Lymphocytes # (Auto) 1.5  1.0-4.8  K/uL


 


Monocytes # (Auto) 1.1 H 0.1-1.0  K/uL


 


Eosinophils # (Auto) 0.12  0.00-0.70  K/uL


 


Basophils # (Auto) 0.04  0.00-0.20  K/uL


 


Absolute Immature Granulocyte


(auto 0.06 


 0-1  K/uL





 


Nucleated Red Blood Cells 0.0  0.0-0.19  %


 


Red Blood Cell Morphology See comments   








                                 Chemistry Labs:








Test


 1/19/25


10:18 1/19/25


09:57 Range/Units


 


 


Troponin I


 < 0.05 


 


 0.00-0.05


ng/mL


 


Sodium Level  136  136-145  mmol/L


 


Potassium Level  4.5  3.5-5.1  mmol/L


 


Chloride Level  99 L 101-111  mmol/L


 


Carbon Dioxide Level  30  21-32  mmol/L


 


Blood Urea Nitrogen  38 H 7-18  mg/dL


 


Creatinine  1.5 H 0.5-1.3  mg/dL


 


Glomerular Filtration Rate


Calc 


 47 


 >90  mL/min





 


Random Glucose  162 H   mg/dL


 


Total Calcium  9.7  8.5-10.1  mg/dL


 


Total Creatine Kinase  55    U/L


 


B-Type Natriuretic Peptide  223 H 0-100  pg/mL











Diagnostics / Radiology:


[Copy/Paste Echos/Imaging Report here]





Assessment: 


[My best impression is that the patient has decompensated chronic diastolic 

heart failure from amyloidosis with pulmonary venous hypertension and right 

heart failure.  Chest x-ray is difficult to interpret but there may be 

cardiomegaly and there appears to be vascular redistribution and possibly left 

basilar infiltrates. ]





Plan:


[I concur with current diuretic plan and would follow up with echocardiogram 

tomorrow.  The chest pain is probably from some combination of chest wall pain 

and pulmonary venous hypertension, but if it does not pass or if we see evidence

 of ischemia otherwise we may perform additional testing. ]











TORIBIO MARTINEZ MD             Jan 19, 2025 16:09

## 2025-01-19 NOTE — HMCIMG
CHEST 1VW 



CLINICAL HISTORY:   CHEST PAIN 



COMPARISON: 3/22/2023 



TECHNIQUE:  Single view of the chest was obtained. 



FINDINGS: 

Lungs are clear.  Cardiac size is enlarged but stable. The bony

structures and pacemaker are unchanged. 

 

IMPRESSION:  Cardiomegaly.

## 2025-01-19 NOTE — HP
[facility] HISTORY AND PHYSICAL








Date of Service: Jan 19, 2025 


Time of Service: 20:04





HISTORY OF PRESENT ILLNESS:


[ Date of service:  1/19/2025, patient was seen in ER room 17 





79-year-old male with underlying history of cardiac amyloidosis, history of 

severe aortic stenosis status post bioprosthetic aortic valve replacement with 

concomitant Maze Machado procedure and left atrial appendage ligation in 11/2008, . 

 Paroxysmal atrial fibrillation, history of pacemaker placement, history of 

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

 who presented to the ER for further evaluation of right-sided chest pain with 

associated shortness of breath..





Patient reports that he has been having right-sided chest pain ongoing for the 

past four days.  He was seen in [facility] ER in [place] about four days ago and was 

told cardiac workup was negative.  Patient continued to have intermittent 

episodes of right retrosternal pain which she described as moderate in 

intensity.  Denies any associated pleuritic chest pain or associated radiation 

to the jaw.  Patient has history of cardiac amyloidosis and is followed in 

[facility].  He is also followed by Dr. Juarez in [facility].  Patient is on 

outpatient diuretics with spironolactone, torsemide and Zaroxolyn 3 times a 

week.





Patient's wife reports that patient has dyspnea on minimal exertion and she has 

also noticed that patient has been having increasing edema of the lower 

extremities.  Patient does have history of chronic lower extremity edema with 

chronic venous stasis and redness.  Reports having significant fatigue with 

exertional activities.  Denies any episodes of bleeding or syncopal episode.





On presentation to the hospital, patient was noted to be afebrile with T-max of 

97.9 F, heart rate in the 50s-70's, one blood pressure of 118/77.  Chest x-ray 

showed mild pulmonary vascular congestion.  Labs on presentation [place] 

[place] 470[address], hemoglobin 10.7, platelet count of 051268.  CMP remarkable for 

sodium of 136, potassium 4.5, chloride of 99, creatinine 1.5, BNP of 223.





Patient will be admitted for further evaluation of chest pain, acute diastolic 

heart failure exacerbation in the setting of cardiac amyloidosis, concern for 

developing cellulitis of the lower extremity.  Patient will be started on IV 

diuretics and IV antibiotics.  Patient also noted to have mild audible wheezing 

likely secondary to pulmonary vascular condition.  Patient's case was discussed 

with Cardiology and we will see how he progresses in the next 48 hours.





Discussed with patient's son who is a practicing cardiologist about plan of care

 as well as with patient's wife.








REVIEW OF SYSTEMS


CONSTITUTIONAL:  fatigue, malaise 


NEUROLOGICAL:  Denies headache, amaurosis fugax, motor weakness, sensory 

deficit, vertigo/spinning sensation, gait abnormalities, or tremors.


ENT: No hearing loss, otalgia, otorrhea, rhinitis, rhinorrhea, hoarseness, or 

sore throat.


CARDIOVASCULAR:  SOB, CHAVARRIA, Chest pain, lower extremity edema 


PULMONARY:  Denies any shortness of breath, cough, phlegm/sputum, hemoptysis, 

pleuritic chest pain.


SLEEP: Denies morning headaches, daytime somnolence or napping.  Denies 

difficulty falling asleep, staying asleep, waking from sleep.  Denies knowledge 

of snoring.


GASTROINTESTINAL:  Denies any type of dysphagia to either liquids or solids.  

Denies nausea, vomiting, pyrosis, early satiety, abdominal pain, diarrhea, 

constipation, or changes in stool consistency or caliber. Denies coffee-ground 

emesis, hematemesis, hematochezia, or melanotic stools.


GENITOURINARY:  Denies frequency, urgency, nocturia, hematuria or incontinence 

(Storage/Irritative symptoms.)  Low urinary stream, straining to void, urinary 

intermittency or hesitancy, splitting of the voiding stream, terminal dribbling.

   


ENDOCRINOLOGIC:  Denies polyuria, polydipsia, polyphagia or heat/cold 

intolerances.


HEMATOLOGIC:  Denies thrombophilia/previous clots, or coagulopathy/bleeding 

disorders. 


ONCOLOGIC:  Denies personal history of malignancy.


DERMATOLOGIC:  erythema of bilateral lower extremities  


PSYCHIATRIC:  Denies any suicidal or homicidal ideation. Denies hallucinations.


PAST MEDICAL HISTORY:


Hypothyroidism


Amyloidosis


Amyloid induced cardiomyopathy


History of diastolic heart failure


Atrial fibrillation with RVR


Aortic stenosis s/p aortic valve replacement


Dyslipidemia


Prostate cancer s/p radiation therapy, in remission


History of pacemaker placement


History of paroxysmal atrial fibrillation


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





PAST SURGICAL HISTORY:





Bilateral knee replacement


Aortic valve replacement


IVC filter placement


Gastric sleeve surgery





PAST SOCIAL HISTORY:





Denies smoking, drinking or illicit drug use.  Independent with ADLs, needs 

assistance with IADLs.  Patient's son is a practicing cardiologist


Coded Allergies:  


     No Known Drug Allergies (Verified  Allergy, Unknown, 8/21/18)


     triamcinolone (Unverified  Allergy, Unknown, 8/21/18)





PHYSICAL EXAM


GENERAL APPEARANCE:  The patient is awake, alert, and oriented, in no acute 

cardiopulmonary distress.


NEUROLOGICAL:  Cranial nerves II-XII grossly intact.  Motor is 5/5 in bilateral 

upper and lower extremities proximal to distal.  No sensory deficits.


HEENT:  Face is symmetric. Pupils are equal and reactive.  Extraocular movements

 are intact.


NECK:  Supple.  No JVD. No thyromegaly. No submental, submandibular, pre-

/postauricular, occipital or supraclavicular lymphadenopathy.


CHEST:  Normal chest expansion. No Telemetry.


LUNGS:  Crackles noted at bilateral lung bases with rhonchorous breath sounds


CARDIOVASCULAR:  Regular.  S1 and S2 normal.  No appreciable rubs, murmurs or 

gallops. 


ABDOMEN:  Soft, nontender, and nondistended.  There is no rebound, voluntary 

guarding, or rigidity.


:  Deferred. No Harris.


EXTREMITIES:  2+ pitting edema of the bilateral lower extremity with changes of 

venous status and erythema


SKIN:  No skin breakdown.





                           Vital Sign (Last 24 Hours)








 1/19/25





 19:56


 


Temp 97.3


 


Pulse 84


 


Resp 17


 


B/P (MAP) 118/71


 


Pulse Ox 100


 


O2 Delivery Room Air*


 


O2 Flow Rate 0


 


FiO2 21











LABS:








                                   Laboratory:








Test


 1/19/25


16:09 1/19/25


15:00 1/19/25


11:35 1/19/25


10:18 Range/Units


 


 


Sodium Level 137     136-145  mmol/L


 


Potassium Level 3.8     3.5-5.1  mmol/L


 


Chloride Level 98 L    101-111  mmol/L


 


Carbon Dioxide Level 33 H    21-32  mmol/L


 


Blood Urea Nitrogen 36 H    7-18  mg/dL


 


Creatinine 1.4 H    0.5-1.3  mg/dL


 


Glomerular Filtration Rate


Calc 51 


 


 


 


 >90  mL/min





 


Random Glucose 106 H      mg/dL


 


Hemoglobin A1c 7.3 H    4.0-6.0  %


 


Estimated Average Glucose


(eAG) 163 H


 


 


 


   mg/dL





 


Total Calcium 9.1     8.5-10.1  mg/dL


 


Magnesium Level


 2.10 


 


 


 


 1.80-2.40


mg/dL


 


Iron Level 17 #L      mcg/dL


 


Total Iron Binding Capacity 404     250-450  mcg/dL


 


Percent Iron Saturation 4.2 L    30-44  %


 


Ferritin 16 L      ng/mL


 


Total Creatine Kinase 51       U/L


 


Troponin I High Sensitivity 32.2     4-75  ng/L


 


C-Reactive Protein,


Quantitative 4.30 H


 


 


 


 0.5-3.0  mg/L





 


Procalcitonin < 0.05 L    0.05-0.5  ng/mL


 


Thyroid Stimulating Hormone


(TSH) 4.29 H


 


 


 


 0.36-3.74


uIU/mL


 


Influenza Type A Antigen


 


 Negative For


Type A 


 


 NEGATIVE  





 


Influenza Type B Antigen


 


 Negative For


Type B 


 


 NEGATIVE  





 


SARS-CoV-2, RNA, NAAT


 


 NEGATIVE SARS


CoV-2 


 


 NEGATIVE  





 


Urine Color   LIGHT-YELLOW   YELLOW  


 


Urine Appearance   CLEAR   CLEAR  


 


Urine pH   6.5   5.0-8.0  


 


Urine Specific Gravity   1.007   1.001-1.031  


 


Urine Protein   NEGATIVE   NEGATIVE  mg/dL


 


Urine Glucose (UA)   NEGATIVE   NEGATIVE  mg/dL


 


Urine Ketones   NEGATIVE   NEGATIVE  mg/dL


 


Urine Occult Blood   NEGATIVE   NEGATIVE  


 


Urine Nitrate   NEGATIVE   NEGATIVE  


 


Urine Bilirubin   NEGATIVE   NEGATIVE  mg/dL


 


Urine Urobilinogen   0.2   0.2-1.0  mg/dL


 


Urine Leukocyte Esterase


 


 


 NEGATIVE 


 


 NEGATIVE


Sarah/uL


 


Troponin I


 


 


 


 < 0.05 


 0.00-0.05


ng/mL


 


Test


 1/19/25


09:57 


 


 


 Range/Units


 


 


White Blood Count 10.2     4.8-10.8  K/uL


 


Red Blood Count


 5.35 


 


 


 


 4.50-6.20


MIL/uL


 


Hemoglobin 10.7 L    14.0-18.0  g/dL


 


Hematocrit 36.5 L    42-54  %


 


Mean Corpuscular Volume 68.2 L    79-99  fL


 


Mean Corpuscular Hemoglobin 20.0 L    27.0-33.0  pg


 


Mean Corpuscular Hemoglobin


Concent 29.3 L


 


 


 


 32.0-36.0  g/dL





 


Red Cell Distribution Width 20.4 H    11.0-15.5  %


 


Platelet Count 219     130-400  K/uL


 


Mean Platelet Volume      7.5-10.5  fL


 


Immature Granulocyte % (Auto) 0.6     0-1  %


 


Neutrophils (%) (Auto) 73.0     40.0-77.0  %


 


Lymphocytes (%) (Auto) 14.4 L    21.0-51.0  %


 


Monocytes (%) (Auto) 10.4     3.0-13.0  %


 


Eosinophils (%) (Auto) 1.2     0.0-8.0  %


 


Basophils (%) (Auto) 0.4     0.0-5.0  %


 


Neutrophils # (Auto) 7.5     1.8-7.7  K/uL


 


Lymphocytes # (Auto) 1.5     1.0-4.8  K/uL


 


Monocytes # (Auto) 1.1 H    0.1-1.0  K/uL


 


Eosinophils # (Auto) 0.12     0.00-0.70  K/uL


 


Basophils # (Auto) 0.04     0.00-0.20  K/uL


 


Absolute Immature Granulocyte


(auto 0.06 


 


 


 


 0-1  K/uL





 


Nucleated Red Blood Cells 0.0     0.0-0.19  %


 


Red Blood Cell Morphology See comments      


 


B-Type Natriuretic Peptide 223 H    0-100  pg/mL











                               Current Medications








 Medications


  (Trade)  Dose


 Ordered  Sig/Chemo


 Route


 PRN Reason  Start Time


 Stop Time Status Last Admin


Dose Admin


 


 Acetaminophen


  (TYLenol 325MG


 TAB)  650 mg  Q6H  PRN


 PO


 MILD PAIN (1-3)  1/19/25 14:00


 2/18/25 13:59   





 


 Acetaminophen


  (TYLenol 325MG


 TAB)  650 mg  Q6H  PRN


 PO


 MILD PAIN (1-3)  1/19/25 14:30


 2/18/25 14:29   





 


 Aspirin


  (Aspirin 81mg Ec


 Tab)  81 mg  DAILY


 PO


   1/20/25 09:00


 2/19/25 08:59   





 


 Atorvastatin


 Calcium


  (LIPItor 20MG)  20 mg  AM


 PO


   1/20/25 09:00


 2/19/25 08:59   





 


 Budesonide


  (Pulmicort 0.5


 Mg/2ml)  0.5 mg  Q12H


 IH


   1/19/25 14:00


 1/19/25 14:17 DC  





 


 Ceftriaxone Sodium


  (ROCEphine 1G


 INJ)  1 gm  Q12H


 IVPB


   1/19/25 14:00


 1/29/25 13:59  1/19/25 14:53


1 GM


 


 Furosemide


  (LASix 20MG VIAL)  20 mg  Q8H


 IV


   1/19/25 16:30


 2/18/25 16:29   





 


 Home Med


  (Home Medication)  (Tafamidis


 (Vyndamax)


 61 MG)  DAILY


 PO


   1/20/25 09:00


 2/19/25 08:59   





 


 Ipratropium


 Bromide


  (AtrovENT UD)  0.5 mg  [id number]


 IH


   1/19/25 18:00


 2/18/25 17:59  1/19/25 19:46


0.5 MG


 


 Metoprolol


 Tartrate


  (loprESSOR)  25 mg  BID


 PO


   1/19/25 21:00


 2/18/25 20:59   





 


 Nitroglycerin


  (Nitrostat)  0.4 mg  AD  PRN


 SL


 CHEST PAIN  1/19/25 14:30


 2/18/25 14:29   





 


 Ondansetron HCl


  (zoFRAN 4MG INJ)  4 mg  Q6H  PRN


 IVP


 NAUSEA/VOMITING  1/19/25 14:00


 2/18/25 13:59   





 


 Pantoprazole


 Sodium


  (PROTonix 40MG


 INJ)  40 mg  Q24H


 IVP


   1/19/25 14:30


 2/18/25 14:29  1/19/25 14:53


40 MG


 


 Potassium Chloride


  (K-Dur/Klor-Con


 20meq)  20 meq  BID


 PO


   1/19/25 21:00


 2/18/25 20:59   





 


 Spironolactone


  (Aldactone 25mg)  25 mg  AM


 PO


   1/20/25 09:00


 2/19/25 08:59   














DIAGNOSTICS / RADIOLOGY:


SERVICE DT: 01/19/25 0938





REASON: CHEST PAIN


ORDERING PHYSICIAN: JESSICA TIDWELL MD


PROCEDURE: CXR1VW  -  CHEST 1VW





CHEST 1VW 





CLINICAL HISTORY:   CHEST PAIN 





COMPARISON: 3/22/2023 





TECHNIQUE:  Single view of the chest was obtained. 





FINDINGS: 


Lungs are clear.  Cardiac size is enlarged but stable. The bony


structures and pacemaker are unchanged. 


 


IMPRESSION:  Cardiomegaly.








DICTATED BY: HANY ESPINOZA DO


DATE: 01/19/25 1051





ELECTRONICALLY SIGNED BY: HANY ESPINOZA DO


DATE: 01/19/25 1053





ASSESSMENT:


Acute on chronic chronic diastolic heart failure exacerbation, POA 


Underlying history of cardiac amyloidosis, POA 


Atypical chest pain, POA 


Developing cellulitis of the bilateral lower extremities with underlying history

 of lower extremity venous stasis/edema, POA 


Severe iron deficiency anemia, POA 


Frailty, POA 


CKD stage 3, POA 


Hypertension, POA


Hyperlipidemia, POA 


History of severe aortic stenosis status post bioprosthetic aortic valve 

replacement with left atrial appendage ligation and Maze Machado procedure, 11/2008


History of paroxysmal atrial fibrillation, POA


History of IVC filter placement, POA 


Remote history of gastric sleeve surgery in 2010, POA 


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 


History of pacemaker placement, POA 





PLAN:


Patient will be admitted to cardiac telemetry floor 


We will trend troponin to rule out active ACS 


Patient received IV Lasix 40 mg in the ER with urine output of about 2 L, we 

will start IV diuresis with Lasix 20 mg q.8 hours 


Continue with home dose potassium of 20 mEq b.i.d., maintain magnesium greater 

than two 


Patient has a history of lower extremity venous stasis with erythema, patient 

may be developing superimposed cellulitis as well, we will start patient on IV 

Rocephin 1 g b.i.d. 


Appreciate recommendations by Dr. Castellanos with Cardiology 


We will obtain 2D echocardiogram


Patient with rhonchorous breath sounds with wheezing and tachypnea on 

examination, symptoms are improving after IV Lasix, likely secondary to 

pulmonary edema, we will start patient on Pulmicort and Atrovent, we will have 

pulmonology follow up with this patient


Home medications were reconciled and updated 


All labs will be repeated in the morning 


With regards to iron deficiency anemia, we will start patient on IV iron sucrose

 infusion while inpatient


DVT prophylaxis with Lovenox renally dose, GI prophylaxis with Protonix 





Date of service:  1/19/2025





Anticipate hospitalization for at least 48 hours,





Stan Pineda MD 





Advanced Care Planning:





Which of the following were discussed:


Hospice care:  Yes __  No _X_


Therapeutic options:  Yes _X_  No __


Advance directives:  Yes _X_  No __


Other discussions: 





Discussed with who?:  Patient





Voluntary nature of this service was explained to the patient?


Yes _x_  No __





Amount of time spent:  20 minutes











STAN PINEDA MD             Jan 19, 2025 20:20

## 2025-01-19 NOTE — NUR
PATIENT STATES HE WANTS TO GO AMA. DR KELLER MADE AWARE. PATIENT DECIDED TO 
STAY. CHARGE NURSE SEGUNDO ALSO SPOKE WITH PATIENT

## 2025-01-20 VITALS
OXYGEN SATURATION: 98 % | RESPIRATION RATE: 20 BRPM | SYSTOLIC BLOOD PRESSURE: 137 MMHG | DIASTOLIC BLOOD PRESSURE: 74 MMHG | HEART RATE: 74 BPM

## 2025-01-20 VITALS — OXYGEN SATURATION: 99 % | HEART RATE: 91 BPM | RESPIRATION RATE: 20 BRPM

## 2025-01-20 VITALS — TEMPERATURE: 98.1 F

## 2025-01-20 VITALS — HEART RATE: 89 BPM | RESPIRATION RATE: 20 BRPM | OXYGEN SATURATION: 96 %

## 2025-01-20 LAB
ALBUMIN SERPL-MCNC: 3.4 G/DL (ref 3.5–5)
ALT SERPL-CCNC: 18 U/L (ref 12–78)
AST SERPL-CCNC: 19 U/L (ref 10–37)
BASOPHILS # BLD AUTO: 0.03 K/UL (ref 0–0.2)
BASOPHILS NFR BLD AUTO: 0.3 % (ref 0–5)
BILIRUB SERPL-MCNC: 0.8 MG/DL (ref 0.2–1)
BUN SERPL-MCNC: 35 MG/DL (ref 7–18)
CHLORIDE SERPL-SCNC: 97 MMOL/L (ref 101–111)
CO2 SERPL-SCNC: 31 MMOL/L (ref 21–32)
CREAT SERPL-MCNC: 1.4 MG/DL (ref 0.5–1.3)
EOSINOPHIL # BLD AUTO: 0.15 K/UL (ref 0–0.7)
EOSINOPHIL NFR BLD AUTO: 1.5 % (ref 0–8)
ERYTHROCYTE [DISTWIDTH] IN BLOOD BY AUTOMATED COUNT: 20.2 % (ref 11–15.5)
GFR SERPL CREATININE-BSD FRML MDRD: 51 ML/MIN (ref 90–?)
GLUCOSE SERPL-MCNC: 133 MG/DL (ref 70–105)
HCT VFR BLD AUTO: 35.7 % (ref 42–54)
IMM GRANULOCYTES # BLD: 0.05 K/UL (ref 0–1)
LYMPHOCYTES # SPEC AUTO: 1.5 K/UL (ref 1–4.8)
LYMPHOCYTES NFR SPEC AUTO: 15.3 % (ref 21–51)
MAGNESIUM SERPL-MCNC: 2.1 MG/DL (ref 1.8–2.4)
MCH RBC QN AUTO: 19.8 PG (ref 27–33)
MCHC RBC AUTO-ENTMCNC: 28.6 G/DL (ref 32–36)
MCV RBC AUTO: 69.5 FL (ref 79–99)
MONOCYTES # BLD AUTO: 1.2 K/UL (ref 0.1–1)
MONOCYTES NFR BLD AUTO: 11.6 % (ref 3–13)
NEUTROPHILS # BLD AUTO: 7.2 K/UL (ref 1.8–7.7)
NEUTROPHILS NFR BLD AUTO: 70.8 % (ref 40–77)
NRBC BLD MANUAL-RTO: 0 % (ref 0–0.19)
PLATELET # BLD AUTO: 201 K/UL (ref 130–400)
POTASSIUM SERPL-SCNC: 4 MMOL/L (ref 3.5–5.1)
PROT SERPL-MCNC: 7.5 G/DL (ref 6–8.3)
RBC # BLD AUTO: 5.14 MIL/UL (ref 4.5–6.2)
SODIUM SERPL-SCNC: 136 MMOL/L (ref 136–145)
WBC # BLD AUTO: 10.1 K/UL (ref 4.8–10.8)

## 2025-01-20 RX ADMIN — THERA TABS SCH TAB: TAB at 09:13

## 2025-01-20 RX ADMIN — SPIRONOLACTONE SCH MG: 25 TABLET, FILM COATED ORAL at 09:12

## 2025-01-20 RX ADMIN — ASPIRIN TAB DELAYED RELEASE 81 MG SCH MG: 81 TABLET DELAYED RESPONSE at 09:13

## 2025-01-20 NOTE — NUR
PT VOICED HE WANTS TO LEAVE AMA DUE TO THE COLD WEATHER AND BECAUSE HE LIVES IN 
[place]. MADE PT AWARE OF RISKS AND STUDIES PENDING DURING HIS STAY. PT 
REPORTS HE STILL WANTS TO LEAVE. WILL MAKE ADMITTING GROUP AWARE. PT SIGNED AMA 
FORM, PLACED IN PTS CHART.

## 2025-01-20 NOTE — NUR
RECIEVED PT 10:00, PER REPORT PT HAD BEEN WANTING TO LEAVE AMA, ALL RISKS 
EXPLAINED TO HIM. PT'S DAUGHTER ARRIVED NOW AND AT PT'S SIDE, PT GIVEN ANOTHER 
EXPLANATION OF RISKS OF LEAVAVING AGAINST DOCTOR'S ADVICE INCLUDING WORSENING 
OF CONDITION AND POSSIBLY EVEN DEATH, PT VERBALIZED UNDERSTANDING, FORM SIGNED 
AND PLACED IN CHART.

## 2025-01-20 NOTE — PN
[facility] PROGRESS NOTE 





Date Patient Seen: Jan 20, 2025 


Time of Visit: 15:50


Supervising Physician: Dr. Santosh Moore


Consulting Physician: Dr Pineda


Outpatient Specialists: [ ]


Inpatient Consults:  Cardiology, [facility] pulmonology





PROBLEM LIST:


Patient was evaluated at bedside today, he is currently on room air and denies 

any episodes of shortness of breath or chest pain.  Lower extremity Doppler 

shows no sign of DVT.  Chest x-ray shows cardiomegaly with no acute pulmonary 

findings.  Patient continues on nebulizer treatments and Rocephin.  Currently 

being diuresed with Lasix 20 mg q.8 hours.  Recommendations to continue with 

incentive spirometry and follow recommendations from Cardiology at this time.  

Dyspnea likely related to CHF.  Patient should continue CPAP throughout the 

admission.








PLAN:


Follow cardiology recommendations


DuoNeb q.6 for shortness of breaths 


Continue CPAP 


Continue incentive spirometry 


Deep breathing exercises 


PD/OT eval and treat


Lower extremity venous Dopplers negative for DVT


If continued to be dyspneic or requiring more oxygen we will recommend CTA of 

the chest








INTERVAL HISTORY:


[ ]





REVIEW OF SYSTEMS: 


12 point ROS reviewed with patient. Pertinent positives mentioned above. 

Otherwise negative.





PHYSICAL EXAM: 


GENERAL: alert, weak, awake oriented x 3


HEENT: EOMI, Sclera non icteric, moist mucosa 


NECK: Supple, no JVD, trachea midline 


LUNGS: Clear breath sounds bilaterally. No wheezes


HEART: Regular rate and rhythm. Normal S1 and S2, without murmurs 


ABD: Abdomen soft, nontender. Bowel sounds present


EXT: No clubbing cyanosis 1+ pitting edema, diffuse discoloration of bilateral 

lower extremity


NEURO: Alert and oriented to person, follows commands





                             Vital Signs (last 8hr)








  Date Time  Temp Pulse Resp B/P (MAP) Pulse Ox O2 Delivery O2 Flow Rate FiO2


 


1/20/25 09:24  74 20 137/74 98 Room Air* 0 21


 


1/20/25 07:55 98.1 85 20 136/74 99 Room Air* 0 21











LABS:





                                Hematology Labs:








Test


 1/20/25


06:22 1/19/25


09:57 Range/Units


 


 


White Blood Count 10.1   4.8-10.8  K/uL


 


Red Blood Count


 5.14 


 


 4.50-6.20


MIL/uL


 


Hemoglobin 10.2 L  14.0-18.0  g/dL


 


Hematocrit 35.7 L  42-54  %


 


Mean Corpuscular Volume 69.5 L  79-99  fL


 


Mean Corpuscular Hemoglobin 19.8 L  27.0-33.0  pg


 


Mean Corpuscular Hemoglobin


Concent 28.6 L


 


 32.0-36.0  g/dL





 


Red Cell Distribution Width 20.2 H  11.0-15.5  %


 


Platelet Count 201   130-400  K/uL


 


Mean Platelet Volume 10.0   7.5-10.5  fL


 


Immature Granulocyte % (Auto) 0.5   0-1  %


 


Neutrophils (%) (Auto) 70.8   40.0-77.0  %


 


Lymphocytes (%) (Auto) 15.3 L  21.0-51.0  %


 


Monocytes (%) (Auto) 11.6   3.0-13.0  %


 


Eosinophils (%) (Auto) 1.5   0.0-8.0  %


 


Basophils (%) (Auto) 0.3   0.0-5.0  %


 


Neutrophils # (Auto) 7.2   1.8-7.7  K/uL


 


Lymphocytes # (Auto) 1.5   1.0-4.8  K/uL


 


Monocytes # (Auto) 1.2 H  0.1-1.0  K/uL


 


Eosinophils # (Auto) 0.15   0.00-0.70  K/uL


 


Basophils # (Auto) 0.03   0.00-0.20  K/uL


 


Absolute Immature Granulocyte


(auto 0.05 


 


 0-1  K/uL





 


Nucleated Red Blood Cells 0.0   0.0-0.19  %


 


Red Blood Cell Morphology  See comments   








                                 Chemistry Labs:








Test


 1/20/25


06:22 1/19/25


20:27 1/19/25


16:09 1/19/25


10:18 Range/Units


 


 


Sodium Level 136     136-145  mmol/L


 


Potassium Level 4.0     3.5-5.1  mmol/L


 


Chloride Level 97 L    101-111  mmol/L


 


Carbon Dioxide Level 31     21-32  mmol/L


 


Blood Urea Nitrogen 35 H    7-18  mg/dL


 


Creatinine 1.4 H    0.5-1.3  mg/dL


 


Glomerular Filtration Rate


Calc 51 


 


 


 


 >90  mL/min





 


Random Glucose 133 H      mg/dL


 


Total Calcium 9.2     8.5-10.1  mg/dL


 


Magnesium Level


 2.10 


 


 


 


 1.80-2.40


mg/dL


 


Total Bilirubin 0.8     0.2-1.0  mg/dL


 


Aspartate Amino Transf


(AST/SGOT) 19 


 


 


 


 10-37  U/L





 


Alanine Aminotransferase


(ALT/SGPT) 18 


 


 


 


 12-78  U/L





 


Alkaline Phosphatase 105       U/L


 


Total Protein 7.5     6.0-8.3  g/dL


 


Albumin 3.4 L    3.5-5.0  g/dL


 


Total Creatine Kinase  55      U/L


 


Troponin I High Sensitivity  35.5    4-75  ng/L


 


Hemoglobin A1c   7.3 H  4.0-6.0  %


 


Estimated Average Glucose


(eAG) 


 


 163 H


 


   mg/dL





 


Iron Level   17 #L    mcg/dL


 


Total Iron Binding Capacity   404   250-450  mcg/dL


 


Percent Iron Saturation   4.2 L  30-44  %


 


Ferritin   16 L    ng/mL


 


C-Reactive Protein,


Quantitative 


 


 4.30 H


 


 0.5-3.0  mg/L





 


Procalcitonin   < 0.05 L  0.05-0.5  ng/mL


 


Thyroid Stimulating Hormone


(TSH) 


 


 4.29 H


 


 0.36-3.74


uIU/mL


 


Troponin I


 


 


 


 < 0.05 


 0.00-0.05


ng/mL


 


Test


 1/19/25


09:57 


 


 


 Range/Units


 


 


B-Type Natriuretic Peptide 223 H    0-100  pg/mL











DIAGNOSTICS / RADIOLOGY RESULTS:


[ ]





PLAN 





NEURO: 


Minimize central acting medications as possible. 


Maintain fall precautions, adequate lighting during the day





PULMONARY: 


Supplemental 02 as needed. 


Maintain aspiration precautions at all times





CARDIOVASCULAR: 


Follow hemodynamics. 


Vital signs per facility protocol





GI & NUTRITION:


Continue with nutritional support.


Continue stool softeners and laxatives as needed.





KIDNEYS & ELECTROLYTES: 


Strict monitoring of intake, output and overall fluid balance. 


Avoid nephrotoxic medications to the extent possible. 


Medications to be dosed according to renal function.


Monitor electrolytes and replace as needed





ENDOCRINE:


Maintain blood glucose between 100-180 at all times.


Hypoglycemia protocol in place





INFECTIOUS DISEASE: 


Trend temperature, WBC and procalcitonin level


Follow cultures, deescalate antibiotics as soon as possible.


Panculture if new onset fever





ONCOLOGY/HEMATOLOGY/COAGULATION: 


Monitor for s/s of bleeding


Monitor hemoglobin, coagulation studies as needed





SKIN:


Pressure ulcer prevention per facility protocol


Specialty mattress





ORTHO/REHAB:


Continue PT/OT 





Prophylaxis:


Continue GI and DVT prophylaxis





Code Status: 


Full Resuscitation





Disposition:


TBD





Other:


Total patient care time exceeds 35 minutes excluding all procedures.











KRISTINE RAMIREZ             Jan 20, 2025 15:50

## 2025-01-20 NOTE — [facility]
--------------- APPROVED REPORT --------------





EXAM: Two-dimensional and M-mode echocardiogram with Doppler and color Doppler.



INDICATION

ICD:  HEART FAILURE EXACERBATION



2D Dimensions

RVDd5.7 cmLVEF(%)47.4 (>50%)LVED Vol(simp.)148.0 mL

IVSd1.3 (0.7-1.1cm)FS(%)24 %LVES Vol(simp.)79.0 mL

LVDd5.4 (3.8-5.6cm)LA (2D)5.5 (1.6-4.0cm)LVEF(%, simp.)47 %

PWd1.6 (0.7-1.1cm)Ao Root(2D)4.9 (2.0-3.7cm)LA ESV INDEX (4CH)73.00 mL/m2

IVSs1.6 cmLVOT diam2.9 (1.8-2.4cm)LA ESV INDEX (2CH)54.80 mL/m2

LVDs4.1 (2.5-4.0cm)IVC diam2.5 cmLA ESV INDEX (BP)66.50 mL/m2

PWs2.1 cm



M-Mode Dimensions

EPSS1.0 cm

LA (MM)4.7 (1.6-4.0cm)

Ao Root(MM)5.2 (2.0-3.7cm)



Aortic Valve

AoV VTI0.6 mAo Peak GR33.0 mmHgLVOT VTI0.24 m

[place] (VMAX)2.6 cm2Ao Mean GR21.0 mmHgAVA (VTI) 2.6 cm2



Mitral Valve

MV E Vmax83.9 cm/sDECEL Qddr477 ms

MV A Vmax29.6 cm/sP 1/2 T71 ms

E/A ratio2.8MVA (PHT)3.1 cm2

MR Max PG23 mmHg



TDI

E/E' Bvnizq26.8E/E' Klpmayr63.0

Medial E' Peak V5.00 cm/sLateral E' Peak V7.60 cm/s



Left Ventricle

Left ventricular cavity size is normal. Mild concentric left ventricular hypertrophy. LVEF is 45-50%.
 Indeterminate diastolic function.



Right Ventricle

The right ventricle is moderately dilated. The right ventricular systolic function is normal.



Atria

The left atrium is severely dilated. The right atrium is severely dilated.



Aortic Valve

The aortic valve is calcified and displays decreased opening. The aortic valve cusps are not well vis
ualized. No aortic regurgitation is present. There is mild to moderate valvular aortic stenosis. High
est mean aortic valve gradient is 21mmHg. Peak aortic valve gradient is 33mmHg.



Mitral Valve

Mitral valve leaflets open well. Mitral regurgitation is trace. There is no mitral valve stenosis.



Tricuspid Valve

The tricuspid valve leaflets appear normal. There is trace tricuspid valve regurgitation noted.



Pulmonic Valve

The pulmonary valve is not well visualized. There is no pulmonic valvular regurgitation. 



Great Vessels

Aortic root is moderately dilated. IVC is dilated and collapses >50% with inspiration.



Pericardium

No pericardial effusion.



 Other Information 

Quality : FairRhythm : NSR



Conclusion

LVEF is 45-50%.

Mild concentric left ventricular hypertrophy.

Indeterminate diastolic function.

The right ventricle is moderately dilated.

Severe biatrial enlargement

Mild to moderate aortic stenosis. Vpk 3 m/s, pk/mn gradients of 33/21 mmHg.

## 2025-01-20 NOTE — PN
[facility] Cardiology Progress Note














PROBLEM LIST:





Unstable angina


Amyloidosis and amyloid cardiomyopathy


Paroxysmal atrial fibrillation


History of DC cardioversion 2009


Acute on chronic systolic CHF


2D echo on 01/19 with EF 45-50% and mild-to-moderate AS with peak and mean of 33

and 21 mm Hg


History of bioprosthetic AVR with Maze procedure and LA ligation November 2008


May 2023 echo, EF 50-55%, functioning bioprosthetic AVR, mild MR


Biventricular Medtronic pacemaker change out March 23, 2023


CardioMEMS placement and used in April 2021


History of prostate cancer


Lumbosacral disc disease 








INTERVAL HISTORY: [Home medications include metolazone 2.5 mg daily, torsemide 

20 mg b.i.d., spironolactone.  He is -4 L fluid deficit.]








PHYSICAL EXAMINATION:





                            Vital Signs (Last 48hrs)








  Date Time  Temp Pulse Resp B/P (MAP) Pulse Ox O2 Delivery O2 Flow Rate FiO2


 


1/20/25 09:24  74 20 137/74 98 Room Air* 0 21 1/20/25 07:55 98.1 85 20 136/74 99 Room Air* 0 21 1/20/25 07:29  89 20   N/A Room Air 0.0 21 1/20/25 07:29  89 20     


 


1/20/25 05:04 97.9 84 20 113/81 96 Room Air* 0 21 1/20/25 02:00 98.1 90 17 112/63 98 Room Air* 0 21 1/20/25 00:37  91 20   N/A Room Air 0.0 21 1/20/25 00:37  91 20     


 


1/20/25 00:00 97.9 94 18 101/51 99 Room Air* 0 21 1/19/25 22:00 97.5 89 16 101/56 97 Room Air* 0 21 1/19/25 19:56 97.3 84 17 118/71 100 Room Air* 0 21 1/19/25 19:46  79 18   N/A Room Air 0.0 21 1/19/25 19:46  79 18     


 


1/19/25 18:43 97.9 72 18 113/72 99 Room Air* 0 21 1/19/25 16:39   18   N/A Room Air  21 1/19/25 15:06 97.7 59 20 112/55 100 Room Air* 0 21 1/19/25 12:39 97.9 53 20 118/77 98 Room Air* 0 21


 


1/19/25 10:16 98.1 89 23 126/73 100 Nasal Cannula* 2 28


 


1/19/25 09:39 98.1 53 16 112/59 99 Room Air* 0 21


 


1/19/25 09:33 98.1 53 16 112/59 99 Room Air 0 





                                        


General: Resting comfortably, no acute distress.


HEENT: Atraumatic. Hearing is intact. No facial asymmetry, nasal discharge, 

icterus or lid lag.


Cardiovascular: Rhythm and rate regular. No murmur 


No edema. 


Respiratory: Lungs clear to the bases. No retractions, wheezes or rhonchi. 


Gastrointestinal: Benign, soft, nontender, nondistended.


Extremities: No amputations. Range of motion is grossly normal. 


Neurology/Psychiatry: No tremors. Speech is clear. Alert and oriented x 3. 

Cooperative and pleasant.








LABORATORY DATA: []





RADIOLOGY: []





PLAN: [Patient left AMA before he could be evaluated in the emergency room 

setting]











JESSICA GARCIA         Jan 20, 2025 14:52


SARY AVALOS MD                Jan 22, 2025 09:51